# Patient Record
Sex: FEMALE | Race: WHITE | NOT HISPANIC OR LATINO | Employment: FULL TIME | URBAN - METROPOLITAN AREA
[De-identification: names, ages, dates, MRNs, and addresses within clinical notes are randomized per-mention and may not be internally consistent; named-entity substitution may affect disease eponyms.]

---

## 2023-08-30 ENCOUNTER — OFFICE VISIT (OUTPATIENT)
Dept: PRIMARY CARE | Facility: CLINIC | Age: 38
End: 2023-08-30
Payer: COMMERCIAL

## 2023-08-30 VITALS
TEMPERATURE: 97.1 F | OXYGEN SATURATION: 96 % | DIASTOLIC BLOOD PRESSURE: 78 MMHG | BODY MASS INDEX: 43.95 KG/M2 | WEIGHT: 280 LBS | SYSTOLIC BLOOD PRESSURE: 136 MMHG | HEART RATE: 97 BPM | HEIGHT: 67 IN

## 2023-08-30 DIAGNOSIS — H69.91 DYSFUNCTION OF RIGHT EUSTACHIAN TUBE: ICD-10-CM

## 2023-08-30 DIAGNOSIS — J32.9 RECURRENT RHINOSINUSITIS: Primary | ICD-10-CM

## 2023-08-30 DIAGNOSIS — I80.01 THROMBOPHLEBITIS OF SUPERFICIAL VEINS OF RIGHT LOWER EXTREMITY: ICD-10-CM

## 2023-08-30 PROCEDURE — 99213 OFFICE O/P EST LOW 20 MIN: CPT | Performed by: INTERNAL MEDICINE

## 2023-08-30 NOTE — PROGRESS NOTES
"Subjective   Janeen Santillan is a 38 y.o. female who presents for Nasal Congestion.  HPI  About 1 month ago had URI with sinus and ear congestion.  Sick for about 4-5 days, then went to  and prescribed bactrim for 10 days and Flonase.  Symptoms improved and developed a rash two days after completing therapy.  Went to  in [place] and told it was a delayed allergic reaction.  Cleared up on its own.    2 weeks ago, recurred, took Mucinex-D.  Feeling better but right ear is full.  Lingering congestion and cough.  Has not used Flonase in the last few days.    Also worried about \"blood clot\" in the back of her leg.  Told my mom to ask.  Has had chronic spot there was warm one day.    Objective   /78   Pulse 97   Temp 36.2 °C (97.1 °F)   Ht 1.689 m (5' 6.5\")   Wt 127 kg (280 lb)   SpO2 96%   BMI 44.52 kg/m²    Physical Exam  NAD  Right OME  Right nasal congestion and swelling  Clear OP  No lymphadenopathy  Lungs clear  Right calf VV with possible slight warm and nodule, non-tender; no edema  Assessment/Plan     Recurrent rhino-sinusitis:  residual right middle ear effusion after URI, persistent mild rhinosinusitis and eustachian tube dysfunction. Demonstrated Politzer maneuver, discussed causes of her symptoms and recommend course of Afrin/sinus wash/nasal ICS with instructions provided.     Superficial thrombophlebitis: ice and NSAIDs, reassured  Problem List Items Addressed This Visit    None           Kris Mckeon MD   "

## 2025-04-09 ENCOUNTER — APPOINTMENT (OUTPATIENT)
Dept: PRIMARY CARE | Facility: CLINIC | Age: 40
End: 2025-04-09
Payer: COMMERCIAL

## 2025-04-09 VITALS
BODY MASS INDEX: 39.37 KG/M2 | WEIGHT: 245 LBS | TEMPERATURE: 98 F | SYSTOLIC BLOOD PRESSURE: 129 MMHG | OXYGEN SATURATION: 98 % | HEIGHT: 66 IN | HEART RATE: 73 BPM | RESPIRATION RATE: 18 BRPM | DIASTOLIC BLOOD PRESSURE: 82 MMHG

## 2025-04-09 DIAGNOSIS — J32.9 CHRONIC RHINOSINUSITIS: Primary | ICD-10-CM

## 2025-04-09 DIAGNOSIS — R00.2 PALPITATIONS: ICD-10-CM

## 2025-04-09 PROBLEM — L70.0 ACNE VULGARIS: Status: ACTIVE | Noted: 2025-04-09

## 2025-04-09 PROBLEM — M25.361 INSTABILITY OF BOTH KNEE JOINTS: Status: ACTIVE | Noted: 2025-04-09

## 2025-04-09 PROBLEM — H69.91 DYSFUNCTION OF RIGHT EUSTACHIAN TUBE: Status: ACTIVE | Noted: 2025-04-09

## 2025-04-09 PROBLEM — L91.8 OTHER HYPERTROPHIC DISORDERS OF THE SKIN: Status: ACTIVE | Noted: 2022-08-25

## 2025-04-09 PROBLEM — D48.5 NEOPLASM OF UNCERTAIN BEHAVIOR OF SKIN: Status: ACTIVE | Noted: 2022-08-25

## 2025-04-09 PROBLEM — J01.90 ACUTE RHINOSINUSITIS: Status: ACTIVE | Noted: 2025-04-09

## 2025-04-09 PROBLEM — M67.431 GANGLION OF RIGHT WRIST: Status: ACTIVE | Noted: 2025-04-09

## 2025-04-09 PROBLEM — M17.0 OSTEOARTHRITIS OF KNEES, BILATERAL: Status: ACTIVE | Noted: 2025-04-09

## 2025-04-09 PROBLEM — M76.70 PERONEAL TENDINITIS: Status: ACTIVE | Noted: 2025-04-09

## 2025-04-09 PROBLEM — G89.29 CHRONIC PAIN OF LEFT KNEE: Status: ACTIVE | Noted: 2025-04-09

## 2025-04-09 PROBLEM — M25.673 ANKLE STIFFNESS: Status: ACTIVE | Noted: 2025-04-09

## 2025-04-09 PROBLEM — M25.362 INSTABILITY OF BOTH KNEE JOINTS: Status: ACTIVE | Noted: 2025-04-09

## 2025-04-09 PROBLEM — L81.4 OTHER MELANIN HYPERPIGMENTATION: Status: ACTIVE | Noted: 2022-08-25

## 2025-04-09 PROBLEM — D23.9 OTHER BENIGN NEOPLASM OF SKIN, UNSPECIFIED: Status: ACTIVE | Noted: 2022-08-25

## 2025-04-09 PROBLEM — M22.2X2 PATELLOFEMORAL PAIN SYNDROME OF LEFT KNEE: Status: ACTIVE | Noted: 2025-04-09

## 2025-04-09 PROBLEM — M25.572 CHRONIC PAIN OF LEFT ANKLE: Status: ACTIVE | Noted: 2025-04-09

## 2025-04-09 PROBLEM — G89.29 CHRONIC PAIN OF LEFT ANKLE: Status: ACTIVE | Noted: 2025-04-09

## 2025-04-09 PROBLEM — D22.5 MELANOCYTIC NEVI OF TRUNK: Status: ACTIVE | Noted: 2022-08-25

## 2025-04-09 PROBLEM — M25.562 CHRONIC PAIN OF LEFT KNEE: Status: ACTIVE | Noted: 2025-04-09

## 2025-04-09 PROBLEM — D18.01 HEMANGIOMA OF SKIN AND SUBCUTANEOUS TISSUE: Status: ACTIVE | Noted: 2022-08-25

## 2025-04-09 ASSESSMENT — ENCOUNTER SYMPTOMS
DEPRESSION: 0
LOSS OF SENSATION IN FEET: 0
OCCASIONAL FEELINGS OF UNSTEADINESS: 0

## 2025-04-09 ASSESSMENT — PATIENT HEALTH QUESTIONNAIRE - PHQ9
SUM OF ALL RESPONSES TO PHQ9 QUESTIONS 1 AND 2: 0
1. LITTLE INTEREST OR PLEASURE IN DOING THINGS: NOT AT ALL
2. FEELING DOWN, DEPRESSED OR HOPELESS: NOT AT ALL

## 2025-04-09 ASSESSMENT — COLUMBIA-SUICIDE SEVERITY RATING SCALE - C-SSRS
2. HAVE YOU ACTUALLY HAD ANY THOUGHTS OF KILLING YOURSELF?: NO
6. HAVE YOU EVER DONE ANYTHING, STARTED TO DO ANYTHING, OR PREPARED TO DO ANYTHING TO END YOUR LIFE?: NO
1. IN THE PAST MONTH, HAVE YOU WISHED YOU WERE DEAD OR WISHED YOU COULD GO TO SLEEP AND NOT WAKE UP?: NO

## 2025-04-09 NOTE — PATIENT INSTRUCTIONS
- take an antihistamine (Claritin, Allegra, Xyzal or similar generic) every night.  - Daily Neti-pot followed by nasal inhaled steroid (Flonase, Nasocort or generic equivalent)  -  let me know how you're doing in 2 weeks.  It may take 4-6 weeks to improve  - We may consider ENT or allergy referral  - try to avoid or limit Afrin and Mucinex-D

## 2025-04-09 NOTE — PROGRESS NOTES
"Subjective   Patient ID: Janeen Santillan is a 39 y.o. female who presents for Nasal Congestion.    Symptoms started with URI in late Nov/Dec.  Self treated with OTC medications, nasal sprays and [place] pot and eventually felt better and mostly cleared up but her nasal congestion never fully went away.  Feels congested daily and worse at night, often wakes up with headaches as well as cough and mucus.  Also has occasional chest tightness and heart fluttering, occurs randomly, brief, nonexertional.  She has been using Mucinex D which seems to be the only thing that works and may use Afrin once or twice a week.  Has not used any other treatments recently since around December  Objective   Physical Exam    /82   Pulse 73   Temp 36.7 °C (98 °F) (Oral)   Resp 18   Ht 1.689 m (5' 6.5\")   Wt 111 kg (245 lb)   SpO2 98%   BMI 38.96 kg/m²      NAD  Normal otic canals, trace right middle ear effusion, normal light reflex  Nasal turbinates are inflammed and hyperemic, voice congested  No conjunctivitis  No adenopathy  MMM  RRR  Lungs CTAB    Assessment/Plan     Chronic rhinosinusitis: likely allergic  - trial of daily H1B and sinus wash with nasal ICS  - limit Afrin and mucinex D    Chest pain/palps: non-cardiace, may be exacerbated by Mucinex-D, CTM      Kris Mckeon MD         "

## 2025-05-05 ENCOUNTER — OFFICE VISIT (OUTPATIENT)
Dept: PRIMARY CARE | Facility: CLINIC | Age: 40
End: 2025-05-05
Payer: COMMERCIAL

## 2025-05-05 VITALS
OXYGEN SATURATION: 97 % | DIASTOLIC BLOOD PRESSURE: 85 MMHG | SYSTOLIC BLOOD PRESSURE: 119 MMHG | WEIGHT: 243.3 LBS | BODY MASS INDEX: 39.1 KG/M2 | HEIGHT: 66 IN | HEART RATE: 93 BPM

## 2025-05-05 DIAGNOSIS — J32.9 CHRONIC RHINOSINUSITIS: Primary | ICD-10-CM

## 2025-05-05 PROCEDURE — 3008F BODY MASS INDEX DOCD: CPT | Performed by: INTERNAL MEDICINE

## 2025-05-05 PROCEDURE — 99213 OFFICE O/P EST LOW 20 MIN: CPT | Performed by: INTERNAL MEDICINE

## 2025-05-05 PROCEDURE — G8433 SCR FOR DEP NOT CPT DOC RSN: HCPCS | Performed by: INTERNAL MEDICINE

## 2025-05-05 RX ORDER — AZELASTINE 1 MG/ML
1 SPRAY, METERED NASAL DAILY
Qty: 30 ML | Refills: 1 | Status: SHIPPED | OUTPATIENT
Start: 2025-05-05 | End: 2026-05-05

## 2025-05-05 ASSESSMENT — PATIENT HEALTH QUESTIONNAIRE - PHQ9
2. FEELING DOWN, DEPRESSED OR HOPELESS: NOT AT ALL
SUM OF ALL RESPONSES TO PHQ9 QUESTIONS 1 AND 2: 0
1. LITTLE INTEREST OR PLEASURE IN DOING THINGS: NOT AT ALL

## 2025-05-05 ASSESSMENT — COLUMBIA-SUICIDE SEVERITY RATING SCALE - C-SSRS
1. IN THE PAST MONTH, HAVE YOU WISHED YOU WERE DEAD OR WISHED YOU COULD GO TO SLEEP AND NOT WAKE UP?: NO
2. HAVE YOU ACTUALLY HAD ANY THOUGHTS OF KILLING YOURSELF?: NO
6. HAVE YOU EVER DONE ANYTHING, STARTED TO DO ANYTHING, OR PREPARED TO DO ANYTHING TO END YOUR LIFE?: NO

## 2025-05-05 NOTE — PROGRESS NOTES
"Subjective   Patient ID: Janeen Santillan is a 39 y.o. female who presents for Follow-up.    See visit 4/9/2025 regarding nasal congestion which started with a URI in late 2024.  We asked her to limit Afrin and Mucinex D (also having chest tightness and palpitations) and start daily antihistamine, sinus wash and nasal ICS.    Not improving.  Using loratidine, NetiPot and Flonase regularly.    This past Friday had some sore throat and Saturday had increased congestion.  Took Mucinex D which helped.  Symptoms persist.  Not feeling sick.  After walking dog felt exhausted.    No change in the palpitations and chest tightness even without taking Mucinex D.  Occur randomly, often at rest, lasts 10 seconds and then disappears.  Occurs a few times per week.  Never for longer stretches.      Objective   Physical Exam    /85   Pulse 93   Ht 1.664 m (5' 5.5\")   Wt 110 kg (243 lb 4.8 oz)   SpO2 97%   BMI 39.87 kg/m²      NAD  Normal otic canals, trace right middle ear effusion, normal light reflex  Nasal turbinates are inflammed and hyperemic, voice congested  No conjunctivitis  No adenopathy  MMM  RRR  Lungs CTAB    Assessment/Plan     Chronic rhinosinusitis: likely allergic  - trial of one month of daily H1B and sinus wash with nasal ICS without benefit  - add azelastine  - referral to ENT (and consider allergy referral)  - limit Afrin and mucinex D  - no antibiotics for now but monitor for worsening     Chest pain/palps: likely mild ectopics, no red flags, may be exacerbated by Mucinex-D, CTM;       Kris Mckeon MD         "

## 2025-05-19 ENCOUNTER — HOSPITAL ENCOUNTER (OUTPATIENT)
Dept: RADIOLOGY | Facility: HOSPITAL | Age: 40
Discharge: HOME | End: 2025-05-19
Payer: COMMERCIAL

## 2025-05-19 ENCOUNTER — APPOINTMENT (OUTPATIENT)
Dept: ORTHOPEDIC SURGERY | Facility: HOSPITAL | Age: 40
End: 2025-05-19
Payer: COMMERCIAL

## 2025-05-19 DIAGNOSIS — M17.12 PATELLOFEMORAL ARTHRITIS OF LEFT KNEE: Primary | ICD-10-CM

## 2025-05-19 DIAGNOSIS — M25.562 LEFT KNEE PAIN, UNSPECIFIED CHRONICITY: ICD-10-CM

## 2025-05-19 PROCEDURE — 73564 X-RAY EXAM KNEE 4 OR MORE: CPT | Mod: LT

## 2025-05-19 PROCEDURE — 73564 X-RAY EXAM KNEE 4 OR MORE: CPT | Mod: LEFT SIDE | Performed by: RADIOLOGY

## 2025-05-19 PROCEDURE — 99214 OFFICE O/P EST MOD 30 MIN: CPT | Performed by: EMERGENCY MEDICINE

## 2025-05-19 RX ORDER — MELOXICAM 15 MG/1
15 TABLET ORAL DAILY
Qty: 30 TABLET | Refills: 1 | Status: SHIPPED | OUTPATIENT
Start: 2025-05-19 | End: 2025-07-18

## 2025-05-19 NOTE — PROGRESS NOTES
Subjective   Patient ID: Janeen Santillan is a 39 y.o. female who presents for Pain of the Left Knee.  History of Present Illness  The patient returns for reevaluation of left knee pain. She has bilateral knee DJD and received bilateral knee Durolane injections on 04/24/2023. In March 2025, she experienced a popping sensation in the posterior left knee while kneeling and leaning back, followed by immediate pain and soreness. She feels instability upon waking, especially during initial steps, but no severe pain. A similar popping sensation occurred a few weeks ago. She is concerned about underlying issues due to her active lifestyle, including running. Her right knee is generally stable but occasionally sore. Pain levels have slightly improved. She has abstained from leg press exercises and takes ibuprofen for pain, which is effective. No renal issues. Not pregnant; last menstrual period was 2 weeks ago.    She is considering an appointment for her ankle, previously evaluated during physical therapy around the COVID-19 pandemic, with potential extra bone development. She reports frequent swelling but no debilitating symptoms. She is concerned about worsening symptoms and believes a follow-up examination is warranted.    MEDICATIONS  Ibuprofen    All other systems have been reviewed and are negative for complaint.      Objective     There were no vitals taken for this visit.     Physical Exam  Left knee:  - No effusion in the left knee  - Crepitus with flexion and extension  - Full flexion and extension  - Negative valgus stress, varus stress, Lachman, and Rebeca tests  - Positive patellar grind and apprehension    Imaging:   I have personally reviewed and agree with Radiologist interpretation of previous imaging studies performed prior to this visit.  STUDY:  Left knee dated  5/19/2025.      INDICATION:  Signs/Symptoms:knee pain      COMPARISON:  Radiographs dated 12/12/2019.      ACCESSION  NUMBER(S):  [id number]      ORDERING CLINICIAN:  MILA RICHEY      TECHNIQUE:  Four views of the left knee.      FINDINGS:  No fracture or dislocation is evident.  No knee effusion is evident.  There is mild tricompartmental degenerative change of the knee.  No  soft tissue gas or radiopaque foreign body is evident.      IMPRESSION:  Degenerative changes without osseous injury evident.      MACRO:  None      Signed by: Nikolas Larkin 5/19/2025 9:40 AM  Dictation workstation:   [id number]      1. Patellofemoral arthritis of left knee  meloxicam (Mobic) 15 mg tablet      2. Left knee pain, unspecified chronicity  XR knee left 4+ views          Assessment & Plan  Left knee pain  - Popping sensation in the posterior left knee in March 2025 and a few weeks ago  - Soreness and instability, especially in the mornings  - Examination shows crepitus, positive patellar grind, and apprehension  - X-rays show arthritis and potential cartilage loss under the kneecap  - Advised to maintain a weight resistance angle of 90 degrees or less during exercises and perform backward lunges  - Prescribed meloxicam 15 mg once daily with food  - Consider repeat gel or steroid injection if symptoms worsen    Ankle pain  - Occasional swelling in the ankle, previously evaluated during physical therapy  - Suspected extra bone contributing to discomfort  - Follow-up appointment will be scheduled    PROCEDURE  Bilateral knee Durolane injections on 04/24/2023.    Mila Richey DO         This medical note was created with the assistance of artificial intelligence (AI) for documentation purposes. The content has been reviewed and confirmed by the healthcare provider for accuracy and completeness. Patient consented to the use of audio recording and use of AI during their visit.

## 2025-06-18 ENCOUNTER — OFFICE VISIT (OUTPATIENT)
Facility: CLINIC | Age: 40
End: 2025-06-18
Payer: COMMERCIAL

## 2025-06-18 VITALS — HEIGHT: 65 IN | BODY MASS INDEX: 40.48 KG/M2 | WEIGHT: 243 LBS

## 2025-06-18 DIAGNOSIS — J39.2 NASOPHARYNGEAL MASS: ICD-10-CM

## 2025-06-18 DIAGNOSIS — J32.9 CHRONIC RHINOSINUSITIS: Primary | ICD-10-CM

## 2025-06-18 DIAGNOSIS — R09.81 NASAL CONGESTION: ICD-10-CM

## 2025-06-18 DIAGNOSIS — R09.82 POST-NASAL DRAINAGE: ICD-10-CM

## 2025-06-18 DIAGNOSIS — J34.2 DEVIATED NASAL SEPTUM: ICD-10-CM

## 2025-06-18 PROCEDURE — 99204 OFFICE O/P NEW MOD 45 MIN: CPT | Performed by: OTOLARYNGOLOGY

## 2025-06-18 PROCEDURE — 31231 NASAL ENDOSCOPY DX: CPT | Performed by: OTOLARYNGOLOGY

## 2025-06-18 PROCEDURE — 3008F BODY MASS INDEX DOCD: CPT | Performed by: OTOLARYNGOLOGY

## 2025-06-18 ASSESSMENT — PATIENT HEALTH QUESTIONNAIRE - PHQ9: 1. LITTLE INTEREST OR PLEASURE IN DOING THINGS: NOT AT ALL

## 2025-06-18 NOTE — PROGRESS NOTES
Sinus & Skull Base Surgery    Chief Complaint:  Chronic sinusitis    History Of Present Illness:  Reason For Visit:   Janeen Santillan was referred to me by Dr. Kris Mckeon for evaluation of chronic sinusitis.    Long history of sinus infections    Over the last 8 months persistent congestion at night; can wake her up.  Nothing she has found fixes it.      She gets up coughing out some mucous.  During the day she can feel congestion intermittently.    Main Symptoms:  Patient does not have anterior nasal drainage.     Patient has posterior nasal drainage.  Happens in the AM.  Patient has nasal airway obstruction.  Bilaterally.  Right seems to be a bit better.    Patient has facial pain.  < 50% of the time.   Patient has facial pressure< 50% of the time.   Patient does not have decreased sense of smell.    Associated Symptoms:   Patient does not have headaches.    Patient has throat clearing.  In the AM.  Patient has coughing.  In the AM.  Patient does not have dysphonia.   Patient does not have nasal bleeding.    Medications currently on for sinonasal symptoms:  Azelastine 1 puff each side Qday  Mucinex D PRN  Vicks vapor inhaler PRN     Medications tried in the past for sinonasal symptoms:  Saline rinse  Flonase   Afrin some provided benefit     Other Pertinent Medical Conditions:   Patient does not have asthma.    Patient does not have aspirin sensitivity.    Patient does not have migraines.    Patient does not have history of allergy testing.   Patient does not have history of sinus surgery.    Patient does not have history of nasal fracture.    Patient has heartburn.  Once in a while.   The patient does not take medical therapy for heartburn.   The patient does not have imaging of sinuses.    Active Problems:  Problem List[1]    Past Medical History:  She has a past medical history of Chronic sinusitis, unspecified and Elevated blood-pressure reading, without diagnosis of hypertension  "(09/09/2019).    Surgical History:  She has a past surgical history that includes Other surgical history (09/09/2019).     Family History:  Family History[2]    Social History:  She reports that she has never smoked. She has never used smokeless tobacco. No history on file for alcohol use and drug use.     Allergies:  Amoxicillin and Sulfa (sulfonamide antibiotics)    Current Meds:  Current Medications[3]    Vitals:  Visit Vitals  Ht 1.651 m (5' 5\")   Wt 110 kg (243 lb)   BMI 40.44 kg/m²   Smoking Status Never   BSA 2.25 m²     Physical Exam:  CONSTITUTIONAL:  Vitals reviewed in nursing chart, well developed, well nourished.    RESPIRATION:  Breathing comfortably, no stridor.  CV:  No clubbing/cyanosis/edema in hands.  EYES:  EOM Intact, sclera normal.  NEURO:  Alert and oriented times 3, Cranial nerves 2-12 intact and symmetric bilaterally.  HEAD AND FACE:  Skin with no masses or lesions, sinuses nontender to palpation.  SALIVARY GLANDS:  Parotid and submandibular glands normal bilaterally.  EARS:  Normal external ears, external auditory canals, and TMs to otoscopy, normal hearing to whispered voice.  NOSE:  External nose midline, anterior rhinoscopy is normal with limited visualization to the anterior aspect of the inferior turbinates (see nasal endoscopy).  ORAL CAVITY/OROPHARYNX/LIPS:  Normal mucous membranes, normal floor of mouth/tongue/OP, no masses or lesions are noted.  NECK/LYMPH:  No LAD, no thyroid masses.    SINONASAL ENDOSCOPY (CPT 36248): To better evaluate the patient's symptoms, sinonasal endoscopy is indicated.  After discussion of risks and benefits, and topical decongestion and anesthesia, an endoscope was used to perform nasal endoscopy on each side.  A time out identifying the patient, the procedure, the location of the procedure and any concerns was performed prior to beginning the procedure.    Findings:  Examination of each nasal cavity revealed a normal middle meatus and sphenoethmoid " recess without pus or polyps. The septum was deviated to the right. Within each posterior choana, there is a lesion nearly blocking each nasal cavity. She had a very good decongestive response.    Results/Data:  I personally reviewed the note from Dr. Kris Mckeon dated 5/5/2025. ***    Provider Impressions:  1. Chronic sinusitis  2. Facial pain/pressure  3. Postnasal drainage  4. Nasal obstruction  5. Throat clearing, cough    Discussion: Janeen Manriquezcara     She could certainly get blood allergy testing.  CT sinus ordered  Have Gissel or Blanca call to schedule follow up.  Follow up virtually with me after completion of the CT sinus.    She was amenable to this, and all questions were answered.    Patient Discussion/Summary:  Welcome to Dr. Arsalan Brandt's clinic.  He is an ENT physician that specializes in nose, sinus, and skull base disorders.    Dr. Arsalan Brandt's office number is [phone].  Please call this number to contact his care team regardless of which office you use to access care.  This number is the most direct way to communicate with all the members of the care team.    His care team includes:    :  Nyla Leon  Available to receive calls Monday through Friday from 8:00 am until 4:25 pm  She can help you with scheduling of appointments and any general, non-medical questions about the practice    Primary nurse:  Gissel Del Valle, RN, BSN  Available to receive calls Tuesday through Friday from 8:00 am until 4:25 pm  Gissel is also Dr. Brandt's surgery scheduler and will assist you with planning and scheduling of your surgery during her office hours    Blanca Mayfield RN, BSN  Available to receive calls Monday through Thursday from 8:00 am until 4:25 pm    Rhinology Nurse Practitioner:  Blanca Crow CNP (sees patients in [place], [facility], and [facility])  She works collaboratively with Dr. Brandt.  If a more urgent appointment is needed she is a wonderful  resource.    Caleb Santiago MD (sees patients in [place] and [facility])  Christian Santiago MD (sees patients in [facility], [facility], and [place])  Dr. Brandt's partners in the division of Rhinology    For your convenience, Dr. Brandt sees patients at [facility] and [facility].  While we try to make your appointments as convenient as possible, occasionally a visit to another location may be necessary to provide the best care for you.    We look forward to working with you to meet your healthcare goals.    Scribe Attestation  By signing my name below, I, Kris Kaitlin, Mic[place]ashley, attest that this documentation has been prepared under the direction and in the presence of Arsalan Brandt MD.    Signature:  Arsalan Brandt MD         [1]   Patient Active Problem List  Diagnosis    Chronic pain of left knee    Acne vulgaris    Acute rhinosinusitis    Ankle stiffness    Chronic pain of left ankle    Dysfunction of right eustachian tube    Ganglion of right wrist    Hemangioma of skin and subcutaneous tissue    Neoplasm of uncertain behavior of skin    Other benign neoplasm of skin, unspecified    Instability of both knee joints    Melanocytic nevi of trunk    Osteoarthritis of knees, bilateral    Other hypertrophic disorders of the skin    Other melanin hyperpigmentation    Patellofemoral pain syndrome of left knee    Peroneal tendinitis   [2] No family history on file.  [3]   Current Outpatient Medications:     azelastine (Astelin) 137 mcg (0.1 %) nasal spray, Administer 1 spray into each nostril once daily. Use in each nostril as directed, Disp: 30 mL, Rfl: 1    meloxicam (Mobic) 15 mg tablet, Take 1 tablet (15 mg) by mouth once daily., Disp: 30 tablet, Rfl: 1     skin    Other melanin hyperpigmentation    Patellofemoral pain syndrome of left knee    Peroneal tendinitis   [2] No family history on file.  [3]   Current Outpatient Medications:     azelastine (Astelin) 137 mcg (0.1 %) nasal spray, Administer 1 spray into each nostril once daily. Use in each nostril as directed, Disp: 30 mL, Rfl: 1    meloxicam (Mobic) 15 mg tablet, Take 1 tablet (15 mg) by mouth once daily., Disp: 30 tablet, Rfl: 1

## 2025-06-18 NOTE — LETTER
June 30, 2025     Kris Mckeon MD  [address] [address]  [facility] [address]    Patient: Janeen Santillan   YOB: 1985   Date of Visit: 6/18/2025       Dear Dr. Kris Mckeon MD:    Thank you for referring Janeen Santillan to me for evaluation. Below are my notes for this consultation.  If you have questions, please do not hesitate to call me. I look forward to following your patient along with you.       Sincerely,     Arsalan Brandt MD      CC: No Recipients  ______________________________________________________________________________________              Sinus & Skull Base Surgery    Chief Complaint:  Chronic sinusitis    History Of Present Illness:  Reason For Visit:   Janeen Santillan was referred to me by Dr. Kris Mckeon for evaluation of chronic sinusitis.    She has a long history of recurrent sinus infections.  Over the last 8 months, she has had persistent nasal congestion at night and this can wake her up.  She has not found anything that resolves this issue despite multiple medical trials.  During the day she can get congested but it is more intermittent.      Main Symptoms:  Patient does not have anterior nasal drainage.     Patient has posterior nasal drainage.  Happens in the AM.  Patient has nasal airway obstruction.  Bilaterally.  Right seems to be a bit better.    Patient has facial pain.  < 50% of the time.   Patient has facial pressure< 50% of the time.   Patient does not have decreased sense of smell.    Associated Symptoms:   Patient does not have headaches.    Patient has throat clearing.  In the AM.  Patient has coughing.  In the AM.  Patient does not have dysphonia.   Patient does not have nasal bleeding.    Medications currently on for sinonasal symptoms:  Azelastine 1 puff each side Qday  Mucinex D PRN  Vicks vapor inhaler PRN     Medications tried in the past for sinonasal symptoms:  Saline rinse  Flonase   Afrin some provided benefit     Other  "Pertinent Medical Conditions:   Patient does not have asthma.    Patient does not have aspirin sensitivity.    Patient does not have migraines.    Patient does not have history of allergy testing.   Patient does not have history of sinus surgery.    Patient does not have history of nasal fracture.    Patient has heartburn.  Once in a while.   The patient does not take medical therapy for heartburn.   The patient does not have imaging of sinuses.    The remainder of a full 10 systems review of systems was pan negative outside of that stated in the history of present illness.    Active Problems:  Problem List[1]    Past Medical History:  She has a past medical history of Chronic sinusitis, unspecified and Elevated blood-pressure reading, without diagnosis of hypertension (09/09/2019).    Surgical History:  She has a past surgical history that includes Other surgical history (09/09/2019).     Family History:  Family History[2]    Social History:  She reports that she has never smoked. She has never used smokeless tobacco. No history on file for alcohol use and drug use.     Allergies:  Amoxicillin and Sulfa (sulfonamide antibiotics)    Current Meds:  Current Medications[3]    Vitals:  Visit Vitals  Ht 1.651 m (5' 5\")   Wt 110 kg (243 lb)   BMI 40.44 kg/m²   Smoking Status Never   BSA 2.25 m²     Physical Exam:  CONSTITUTIONAL:  Vitals reviewed in nursing chart, well developed, well nourished.    RESPIRATION:  Breathing comfortably, no stridor.  CV:  No clubbing/cyanosis/edema in hands.  EYES:  EOM Intact, sclera normal.  NEURO:  Alert and oriented times 3, Cranial nerves 2-12 intact and symmetric bilaterally.  HEAD AND FACE:  Skin with no masses or lesions, sinuses nontender to palpation.  SALIVARY GLANDS:  Parotid and submandibular glands normal bilaterally.  EARS:  Normal external ears, external auditory canals, and TMs to otoscopy, normal hearing to whispered voice.  NOSE:  External nose midline, anterior rhinoscopy " is normal with limited visualization to the anterior aspect of the interior turbinates (see nasal endoscopy).  ORAL CAVITY/OROPHARYNX/LIPS:  Normal mucous membranes, normal floor of mouth/tongue/OP, no masses or lesions are noted.  PHARYNGEAL WALLS AND NASOPHARYNX:  No masses noted.  NECK/LYMPH:  No LAD, no thyroid masses.  LARYNX:  Could not directly visualize transorally.    SINONASAL ENDOSCOPY (CPT 92212): To better evaluate the patient's symptoms, sinonasal endoscopy is indicated.  After discussion of risks and benefits, and topical decongestion and anesthesia, an endoscope was used to perform nasal endoscopy on each side.  A time out identifying the patient, the procedure, the location of the procedure and any concerns was performed prior to beginning the procedure.    Findings: Examination of each nasal cavity revealed a normal middle meatus and sphenoethmoid recess without pus or polyps.  She has a septal deviation to the right.  There was bilateral inferior turban hypertrophy.  Within the nasopharynx, there was a lesion nearly blocking each posterior choana likely the cause of her ongoing congestion.    Results/Data:  I personally reviewed the note from Dr. Kris Mckeon dated 5/5/2025.  During this evaluation she was referred to ENT but was asked to use a number of medical therapies for her ongoing symptoms.    Provider Impressions:  1.  Nasal congestion; deviated septum; inferior turbinate hypertrophy; nasopharyngeal lesion  2.  Postnasal drainage  3.  Facial pain and pressure  4.  Throat clearing, coughing    Discussion: Janeen Santillan and I discussed her exam and symptoms.  I believe that what was noted in her nasopharynx is driving her ongoing congestion.  I recommended a noncontrast CT sinus to further evaluate these findings.  I will ask my nurse to assist in scheduling her so that she can obtain the CT sinus and I can see her either face-to-face or virtually soon after to review those findings.   All questions were answered.    Patient Discussion/Summary:  Welcome to Dr. Arsalan Brandt's clinic.  He is an ENT physician that specializes in nose, sinus, and skull base disorders.    DrOsiel Brandt's office number is [phone].  Please call this number to contact his care team regardless of which office you use to access care.  This number is the most direct way to communicate with all the members of the care team.    His care team includes:    :  Nyla Leon  Available to receive calls Monday through Friday from 8:00 am until 4:25 pm  She can help you with scheduling of appointments and any general, non-medical questions about the practice    Primary nurse:  Gissel Del Valle, RN, BSN  Available to receive calls Tuesday through Friday from 8:00 am until 4:25 pm  Gissel is also Dr. Brandt's surgery scheduler and will assist you with planning and scheduling of your surgery during her office hours    Blanca Mayfield RN, BSN  Available to receive calls Monday through Thursday from 8:00 am until 4:25 pm    Rhinology Nurse Practitioner:  Blanca Crow CNP (sees patients in [place], [facility], and [facility])  She works collaboratively with Dr. Brandt.  If a more urgent appointment is needed she is a wonderful resource.    Caleb Santiago MD (sees patients in [place] and [facility])  Christian Santiago MD (sees patients in [facility], [facility], and [place])  Dr. Brandt's partners in the division of Rhinology    For your convenience, Dr. Brandt sees patients at [facility] and [facility].  While we try to make your appointments as convenient as possible, occasionally a visit to another location may be necessary to provide the best care for you.    We look forward to working with you to meet your healthcare goals.    Scribe Attestation  By signing my name below, SILAS, Shawn Duran, attest that this documentation has been prepared under the direction and in the  presence of Arsalan Barndt MD.    Signature:  Arsalan Brandt MD       [1]  Patient Active Problem List  Diagnosis   • Chronic pain of left knee   • Acne vulgaris   • Acute rhinosinusitis   • Ankle stiffness   • Chronic pain of left ankle   • Dysfunction of right eustachian tube   • Ganglion of right wrist   • Hemangioma of skin and subcutaneous tissue   • Neoplasm of uncertain behavior of skin   • Other benign neoplasm of skin, unspecified   • Instability of both knee joints   • Melanocytic nevi of trunk   • Osteoarthritis of knees, bilateral   • Other hypertrophic disorders of the skin   • Other melanin hyperpigmentation   • Patellofemoral pain syndrome of left knee   • Peroneal tendinitis   [2]  No family history on file.  [3]    Current Outpatient Medications:   •  azelastine (Astelin) 137 mcg (0.1 %) nasal spray, Administer 1 spray into each nostril once daily. Use in each nostril as directed, Disp: 30 mL, Rfl: 1  •  meloxicam (Mobic) 15 mg tablet, Take 1 tablet (15 mg) by mouth once daily., Disp: 30 tablet, Rfl: 1       [1]  Patient Active Problem List  Diagnosis   • Chronic pain of left knee   • Acne vulgaris   • Acute rhinosinusitis   • Ankle stiffness   • Chronic pain of left ankle   • Dysfunction of right eustachian tube   • Ganglion of right wrist   • Hemangioma of skin and subcutaneous tissue   • Neoplasm of uncertain behavior of skin   • Other benign neoplasm of skin, unspecified   • Instability of both knee joints   • Melanocytic nevi of trunk   • Osteoarthritis of knees, bilateral   • Other hypertrophic disorders of the skin   • Other melanin hyperpigmentation   • Patellofemoral pain syndrome of left knee   • Peroneal tendinitis   [2]  No family history on file.  [3]    Current Outpatient Medications:   •  azelastine (Astelin) 137 mcg (0.1 %) nasal spray, Administer 1 spray into each nostril once daily. Use in each nostril as directed, Disp: 30 mL, Rfl: 1  •  meloxicam (Mobic) 15 mg tablet,  Take 1 tablet (15 mg) by mouth once daily., Disp: 30 tablet, Rfl: 1

## 2025-07-02 ENCOUNTER — HOSPITAL ENCOUNTER (OUTPATIENT)
Dept: RADIOLOGY | Facility: CLINIC | Age: 40
Discharge: HOME | End: 2025-07-02
Payer: COMMERCIAL

## 2025-07-02 DIAGNOSIS — J39.2 NASOPHARYNGEAL MASS: ICD-10-CM

## 2025-07-02 PROCEDURE — 70486 CT MAXILLOFACIAL W/O DYE: CPT

## 2025-07-06 NOTE — PROGRESS NOTES
Sinus & Skull Base Surgery    Virtual or Telephone Consent  An interactive audio and video telecommunication system which permits real time communications between the patient (at the originating site) and provider (at the distant site) was utilized to provide this telehealth service.   Verbal consent was requested and obtained from Janeen Santillan on this date, 07/09/25 for a telehealth visit and the patient's location was confirmed at the time of the visit.    Chief Complaint:  1.  Nasal congestion; deviated septum; inferior turbinate hypertrophy; nasopharyngeal lesion  2.  Postnasal drainage  3.  Facial pain and pressure  4.  Throat clearing, coughing    History Of Present Illness:  Janeen Santillan presents since last being seen 6/18/2025. Following that evaluation, I recommended a CT sinus that was obtained. She presents today to review this.    Active Problems:  Problem List[1]    Past Medical History:  She has a past medical history of Chronic sinusitis, unspecified and Elevated blood-pressure reading, without diagnosis of hypertension (09/09/2019).    Surgical History:  She has a past surgical history that includes Other surgical history (09/09/2019).    Family History:  Family History[2]    Social History:  She reports that she has never smoked. She has never used smokeless tobacco. No history on file for alcohol use and drug use.    Allergies:  Amoxicillin and Sulfa (sulfonamide antibiotics)    Current Meds:  Current Medications[3]    Vitals:  Visit Vitals  Smoking Status Never     Physical Exam:  Virtual visit.    Results/Data:  I personally reviewed the CT sinus dated 7/2/2025 with the patient today in clinic.  Her sinuses were clear on the study.  Again demonstrated was what appears to be adenoid hypertrophy.    The impression from the official report is listed below:  CT of the paranasal sinuses, mastoid sinuses and orbits is within normal limits.    Provider Impressions:  1.  Nasal  congestion; deviated septum; inferior turbinate hypertrophy; nasopharyngeal lesion  2.  Postnasal drainage  3.  Facial pain and pressure  4.  Throat clearing, coughing    Discussion:  Janeen Santillan and I discussed her exam and symptoms.  I recommended surgical intervention both to improve her breathing as well as to obtain biopsies of what I am seeing in the nasopharynx.    The surgery itself was discussed at length.  The risks, benefits, and alternatives were explained in detail which include but is not limited to: persistence of symptoms, pain, bleeding, smell loss or alteration, infection, or need for nasal packing.  It would be extremely unlikely with this type of procedure to have any kind of orbital or skull base injury.  Facial numbness would also be unlikely.  We discussed proximity to the soft palate and eustachian tube orifice and risk to those structures with this type of procedure.    She was comfortable moving forward with endonasal surgery and I will schedule her at her convenience moving forward.  All questions were answered.    Scribe Attestation  By signing my name below, I, Kris Madrigal, [place], attest that this documentation has been prepared under the direction and in the presence of Arsalan Brandt MD.    Signature:  Arsalan Brandt MD       [1]   Patient Active Problem List  Diagnosis    Chronic pain of left knee    Acne vulgaris    Acute rhinosinusitis    Ankle stiffness    Chronic pain of left ankle    Dysfunction of right eustachian tube    Ganglion of right wrist    Hemangioma of skin and subcutaneous tissue    Neoplasm of uncertain behavior of skin    Other benign neoplasm of skin, unspecified    Instability of both knee joints    Melanocytic nevi of trunk    Osteoarthritis of knees, bilateral    Other hypertrophic disorders of the skin    Other melanin hyperpigmentation    Patellofemoral pain syndrome of left knee    Peroneal tendinitis   [2] No family history on file.  [3]    Current Outpatient Medications:     azelastine (Astelin) 137 mcg (0.1 %) nasal spray, Administer 1 spray into each nostril once daily. Use in each nostril as directed, Disp: 30 mL, Rfl: 1    meloxicam (Mobic) 15 mg tablet, Take 1 tablet (15 mg) by mouth once daily., Disp: 30 tablet, Rfl: 1

## 2025-07-09 ENCOUNTER — TELEMEDICINE (OUTPATIENT)
Facility: CLINIC | Age: 40
End: 2025-07-09
Payer: COMMERCIAL

## 2025-07-09 ENCOUNTER — APPOINTMENT (OUTPATIENT)
Facility: CLINIC | Age: 40
End: 2025-07-09
Payer: COMMERCIAL

## 2025-07-09 DIAGNOSIS — J39.2 NASOPHARYNGEAL MASS: Primary | ICD-10-CM

## 2025-07-09 DIAGNOSIS — R09.81 NASAL CONGESTION: ICD-10-CM

## 2025-07-09 DIAGNOSIS — J34.3 HYPERTROPHY OF BOTH INFERIOR NASAL TURBINATES: ICD-10-CM

## 2025-07-09 PROCEDURE — 99214 OFFICE O/P EST MOD 30 MIN: CPT | Performed by: OTOLARYNGOLOGY

## 2025-07-11 DIAGNOSIS — J35.2 HYPERTROPHY OF ADENOIDS ALONE: Primary | ICD-10-CM

## 2025-07-11 DIAGNOSIS — J34.3 HYPERTROPHY OF INFERIOR NASAL TURBINATE: ICD-10-CM

## 2025-07-30 DIAGNOSIS — Z41.9 ELECTIVE SURGERY: ICD-10-CM

## 2025-07-30 DIAGNOSIS — J34.3 HYPERTROPHY OF INFERIOR NASAL TURBINATE: ICD-10-CM

## 2025-07-30 DIAGNOSIS — J35.2 HYPERTROPHY OF ADENOIDS ALONE: ICD-10-CM

## 2025-07-31 ENCOUNTER — LAB (OUTPATIENT)
Dept: LAB | Facility: HOSPITAL | Age: 40
End: 2025-07-31
Payer: COMMERCIAL

## 2025-07-31 DIAGNOSIS — J34.3 HYPERTROPHY OF NASAL TURBINATES: Primary | ICD-10-CM

## 2025-07-31 PROCEDURE — 36415 COLL VENOUS BLD VENIPUNCTURE: CPT

## 2025-07-31 PROCEDURE — 85027 COMPLETE CBC AUTOMATED: CPT

## 2025-07-31 PROCEDURE — 80053 COMPREHEN METABOLIC PANEL: CPT

## 2025-08-01 LAB
ALBUMIN SERPL BCP-MCNC: 3.9 G/DL (ref 3.4–5)
ALP SERPL-CCNC: 55 U/L (ref 33–110)
ALT SERPL W P-5'-P-CCNC: 16 U/L (ref 7–45)
ANION GAP SERPL CALC-SCNC: 10 MMOL/L (ref 10–20)
AST SERPL W P-5'-P-CCNC: 15 U/L (ref 9–39)
BILIRUB SERPL-MCNC: 0.4 MG/DL (ref 0–1.2)
BUN SERPL-MCNC: 13 MG/DL (ref 6–23)
CALCIUM SERPL-MCNC: 9.3 MG/DL (ref 8.6–10.6)
CHLORIDE SERPL-SCNC: 104 MMOL/L (ref 98–107)
CO2 SERPL-SCNC: 31 MMOL/L (ref 21–32)
CREAT SERPL-MCNC: 0.82 MG/DL (ref 0.5–1.05)
EGFRCR SERPLBLD CKD-EPI 2021: >90 ML/MIN/1.73M*2
ERYTHROCYTE [DISTWIDTH] IN BLOOD BY AUTOMATED COUNT: 14.1 % (ref 11.5–14.5)
GLUCOSE SERPL-MCNC: 93 MG/DL (ref 74–99)
HCT VFR BLD AUTO: 45.6 % (ref 36–46)
HGB BLD-MCNC: 13.7 G/DL (ref 12–16)
MCH RBC QN AUTO: 26.9 PG (ref 26–34)
MCHC RBC AUTO-ENTMCNC: 30 G/DL (ref 32–36)
MCV RBC AUTO: 89 FL (ref 80–100)
NRBC BLD-RTO: 0 /100 WBCS (ref 0–0)
PLATELET # BLD AUTO: 336 X10*3/UL (ref 150–450)
POTASSIUM SERPL-SCNC: 4.4 MMOL/L (ref 3.5–5.3)
PROT SERPL-MCNC: 7.1 G/DL (ref 6.4–8.2)
RBC # BLD AUTO: 5.1 X10*6/UL (ref 4–5.2)
SODIUM SERPL-SCNC: 141 MMOL/L (ref 136–145)
WBC # BLD AUTO: 8.4 X10*3/UL (ref 4.4–11.3)

## 2025-08-11 ENCOUNTER — HOSPITAL ENCOUNTER (OUTPATIENT)
Dept: RADIOLOGY | Facility: HOSPITAL | Age: 40
Discharge: HOME | End: 2025-08-11
Payer: COMMERCIAL

## 2025-08-11 ENCOUNTER — OFFICE VISIT (OUTPATIENT)
Dept: ORTHOPEDIC SURGERY | Facility: HOSPITAL | Age: 40
End: 2025-08-11
Payer: COMMERCIAL

## 2025-08-11 DIAGNOSIS — M25.572 LEFT ANKLE PAIN, UNSPECIFIED CHRONICITY: ICD-10-CM

## 2025-08-11 DIAGNOSIS — M25.872 IMPINGEMENT OF LEFT ANKLE JOINT: Primary | ICD-10-CM

## 2025-08-11 PROCEDURE — 99213 OFFICE O/P EST LOW 20 MIN: CPT

## 2025-08-11 PROCEDURE — 99214 OFFICE O/P EST MOD 30 MIN: CPT | Performed by: EMERGENCY MEDICINE

## 2025-08-11 PROCEDURE — 1036F TOBACCO NON-USER: CPT | Performed by: EMERGENCY MEDICINE

## 2025-08-11 PROCEDURE — 73610 X-RAY EXAM OF ANKLE: CPT | Mod: LT

## 2025-08-11 PROCEDURE — 73610 X-RAY EXAM OF ANKLE: CPT | Mod: LEFT SIDE | Performed by: RADIOLOGY

## 2025-08-13 ENCOUNTER — ANESTHESIA EVENT (OUTPATIENT)
Dept: OPERATING ROOM | Facility: CLINIC | Age: 40
End: 2025-08-13
Payer: COMMERCIAL

## 2025-08-14 ENCOUNTER — ANESTHESIA (OUTPATIENT)
Dept: OPERATING ROOM | Facility: CLINIC | Age: 40
End: 2025-08-14
Payer: COMMERCIAL

## 2025-08-14 ENCOUNTER — HOSPITAL ENCOUNTER (OUTPATIENT)
Facility: CLINIC | Age: 40
Setting detail: OUTPATIENT SURGERY
Discharge: HOME | End: 2025-08-14
Attending: OTOLARYNGOLOGY | Admitting: OTOLARYNGOLOGY
Payer: COMMERCIAL

## 2025-08-14 VITALS
BODY MASS INDEX: 42.61 KG/M2 | WEIGHT: 255.73 LBS | DIASTOLIC BLOOD PRESSURE: 85 MMHG | RESPIRATION RATE: 16 BRPM | SYSTOLIC BLOOD PRESSURE: 130 MMHG | OXYGEN SATURATION: 99 % | TEMPERATURE: 97.5 F | HEIGHT: 65 IN | HEART RATE: 76 BPM

## 2025-08-14 DIAGNOSIS — Z41.9 ELECTIVE SURGERY: ICD-10-CM

## 2025-08-14 DIAGNOSIS — J34.3 HYPERTROPHY OF INFERIOR NASAL TURBINATE: ICD-10-CM

## 2025-08-14 DIAGNOSIS — G89.18 POST-OPERATIVE PAIN: ICD-10-CM

## 2025-08-14 DIAGNOSIS — J35.2 HYPERTROPHY OF ADENOIDS ALONE: Primary | ICD-10-CM

## 2025-08-14 LAB — PREGNANCY TEST URINE, POC: NEGATIVE

## 2025-08-14 PROCEDURE — 88185 FLOWCYTOMETRY/TC ADD-ON: CPT | Mod: TC | Performed by: OTOLARYNGOLOGY

## 2025-08-14 PROCEDURE — 30930 THER FX NASAL INF TURBINATE: CPT | Performed by: OTOLARYNGOLOGY

## 2025-08-14 PROCEDURE — 3600000009 HC OR TIME - EACH INCREMENTAL 1 MINUTE - PROCEDURE LEVEL FOUR: Performed by: OTOLARYNGOLOGY

## 2025-08-14 PROCEDURE — 7100000001 HC RECOVERY ROOM TIME - INITIAL BASE CHARGE: Performed by: OTOLARYNGOLOGY

## 2025-08-14 PROCEDURE — 7100000009 HC PHASE TWO TIME - INITIAL BASE CHARGE: Performed by: OTOLARYNGOLOGY

## 2025-08-14 PROCEDURE — 2720000007 HC OR 272 NO HCPCS: Performed by: OTOLARYNGOLOGY

## 2025-08-14 PROCEDURE — 3700000002 HC GENERAL ANESTHESIA TIME - EACH INCREMENTAL 1 MINUTE: Performed by: OTOLARYNGOLOGY

## 2025-08-14 PROCEDURE — 88341 IMHCHEM/IMCYTCHM EA ADD ANTB: CPT | Mod: TC,SUR | Performed by: OTOLARYNGOLOGY

## 2025-08-14 PROCEDURE — 3600000004 HC OR TIME - INITIAL BASE CHARGE - PROCEDURE LEVEL FOUR: Performed by: OTOLARYNGOLOGY

## 2025-08-14 PROCEDURE — 7100000010 HC PHASE TWO TIME - EACH INCREMENTAL 1 MINUTE: Performed by: OTOLARYNGOLOGY

## 2025-08-14 PROCEDURE — 3700000001 HC GENERAL ANESTHESIA TIME - INITIAL BASE CHARGE: Performed by: OTOLARYNGOLOGY

## 2025-08-14 PROCEDURE — 42831 REMOVAL OF ADENOIDS: CPT | Performed by: OTOLARYNGOLOGY

## 2025-08-14 PROCEDURE — 81025 URINE PREGNANCY TEST: CPT | Performed by: OTOLARYNGOLOGY

## 2025-08-14 PROCEDURE — 2500000004 HC RX 250 GENERAL PHARMACY W/ HCPCS (ALT 636 FOR OP/ED): Performed by: ANESTHESIOLOGIST ASSISTANT

## 2025-08-14 PROCEDURE — 7100000002 HC RECOVERY ROOM TIME - EACH INCREMENTAL 1 MINUTE: Performed by: OTOLARYNGOLOGY

## 2025-08-14 PROCEDURE — 31231 NASAL ENDOSCOPY DX: CPT | Performed by: OTOLARYNGOLOGY

## 2025-08-14 RX ORDER — ONDANSETRON HYDROCHLORIDE 2 MG/ML
INJECTION, SOLUTION INTRAVENOUS AS NEEDED
Status: DISCONTINUED | OUTPATIENT
Start: 2025-08-14 | End: 2025-08-14

## 2025-08-14 RX ORDER — TRAMADOL HYDROCHLORIDE 50 MG/1
50 TABLET, FILM COATED ORAL EVERY 6 HOURS PRN
Qty: 15 TABLET | Refills: 0 | Status: SHIPPED | OUTPATIENT
Start: 2025-08-14

## 2025-08-14 RX ORDER — FENTANYL CITRATE 50 UG/ML
25 INJECTION, SOLUTION INTRAMUSCULAR; INTRAVENOUS EVERY 5 MIN PRN
Status: DISCONTINUED | OUTPATIENT
Start: 2025-08-14 | End: 2025-08-14 | Stop reason: HOSPADM

## 2025-08-14 RX ORDER — FENTANYL CITRATE 50 UG/ML
INJECTION, SOLUTION INTRAMUSCULAR; INTRAVENOUS AS NEEDED
Status: DISCONTINUED | OUTPATIENT
Start: 2025-08-14 | End: 2025-08-14

## 2025-08-14 RX ORDER — LIDOCAINE HYDROCHLORIDE 20 MG/ML
INJECTION, SOLUTION EPIDURAL; INFILTRATION; INTRACAUDAL; PERINEURAL AS NEEDED
Status: DISCONTINUED | OUTPATIENT
Start: 2025-08-14 | End: 2025-08-14

## 2025-08-14 RX ORDER — OXYCODONE HYDROCHLORIDE 5 MG/1
5 TABLET ORAL
Status: DISCONTINUED | OUTPATIENT
Start: 2025-08-14 | End: 2025-08-14 | Stop reason: HOSPADM

## 2025-08-14 RX ORDER — METOCLOPRAMIDE HYDROCHLORIDE 5 MG/ML
10 INJECTION INTRAMUSCULAR; INTRAVENOUS ONCE AS NEEDED
Status: DISCONTINUED | OUTPATIENT
Start: 2025-08-14 | End: 2025-08-14 | Stop reason: HOSPADM

## 2025-08-14 RX ORDER — ONDANSETRON HYDROCHLORIDE 2 MG/ML
4 INJECTION, SOLUTION INTRAVENOUS ONCE AS NEEDED
Status: DISCONTINUED | OUTPATIENT
Start: 2025-08-14 | End: 2025-08-14 | Stop reason: HOSPADM

## 2025-08-14 RX ORDER — SODIUM CHLORIDE, SODIUM LACTATE, POTASSIUM CHLORIDE, CALCIUM CHLORIDE 600; 310; 30; 20 MG/100ML; MG/100ML; MG/100ML; MG/100ML
INJECTION, SOLUTION INTRAVENOUS CONTINUOUS PRN
Status: DISCONTINUED | OUTPATIENT
Start: 2025-08-14 | End: 2025-08-14

## 2025-08-14 RX ORDER — SODIUM CHLORIDE, SODIUM LACTATE, POTASSIUM CHLORIDE, CALCIUM CHLORIDE 600; 310; 30; 20 MG/100ML; MG/100ML; MG/100ML; MG/100ML
100 INJECTION, SOLUTION INTRAVENOUS CONTINUOUS
Status: DISCONTINUED | OUTPATIENT
Start: 2025-08-14 | End: 2025-08-14 | Stop reason: HOSPADM

## 2025-08-14 RX ORDER — MIDAZOLAM HYDROCHLORIDE 1 MG/ML
INJECTION, SOLUTION INTRAMUSCULAR; INTRAVENOUS AS NEEDED
Status: DISCONTINUED | OUTPATIENT
Start: 2025-08-14 | End: 2025-08-14

## 2025-08-14 RX ORDER — DIPHENHYDRAMINE HYDROCHLORIDE 50 MG/ML
12.5 INJECTION, SOLUTION INTRAMUSCULAR; INTRAVENOUS
Status: DISCONTINUED | OUTPATIENT
Start: 2025-08-14 | End: 2025-08-14 | Stop reason: HOSPADM

## 2025-08-14 RX ORDER — PROPOFOL 10 MG/ML
INJECTION, EMULSION INTRAVENOUS AS NEEDED
Status: DISCONTINUED | OUTPATIENT
Start: 2025-08-14 | End: 2025-08-14

## 2025-08-14 RX ORDER — DEXAMETHASONE SODIUM PHOSPHATE 10 MG/ML
INJECTION INTRAMUSCULAR; INTRAVENOUS AS NEEDED
Status: DISCONTINUED | OUTPATIENT
Start: 2025-08-14 | End: 2025-08-14

## 2025-08-14 RX ORDER — ROCURONIUM BROMIDE 10 MG/ML
INJECTION, SOLUTION INTRAVENOUS AS NEEDED
Status: DISCONTINUED | OUTPATIENT
Start: 2025-08-14 | End: 2025-08-14

## 2025-08-14 RX ORDER — PHENYLEPHRINE HCL IN 0.9% NACL 0.4MG/10ML
SYRINGE (ML) INTRAVENOUS AS NEEDED
Status: DISCONTINUED | OUTPATIENT
Start: 2025-08-14 | End: 2025-08-14

## 2025-08-14 RX ORDER — MEPERIDINE HYDROCHLORIDE 50 MG/ML
12.5 INJECTION INTRAMUSCULAR; INTRAVENOUS; SUBCUTANEOUS EVERY 10 MIN PRN
Status: DISCONTINUED | OUTPATIENT
Start: 2025-08-14 | End: 2025-08-14 | Stop reason: HOSPADM

## 2025-08-14 RX ORDER — CEFAZOLIN 1 G/1
INJECTION, POWDER, FOR SOLUTION INTRAVENOUS AS NEEDED
Status: DISCONTINUED | OUTPATIENT
Start: 2025-08-14 | End: 2025-08-14

## 2025-08-14 RX ORDER — HYDROMORPHONE HYDROCHLORIDE 1 MG/ML
0.5 INJECTION, SOLUTION INTRAMUSCULAR; INTRAVENOUS; SUBCUTANEOUS EVERY 5 MIN PRN
Status: DISCONTINUED | OUTPATIENT
Start: 2025-08-14 | End: 2025-08-14 | Stop reason: HOSPADM

## 2025-08-14 RX ADMIN — LIDOCAINE HYDROCHLORIDE 100 MG: 20 INJECTION, SOLUTION EPIDURAL; INFILTRATION; INTRACAUDAL; PERINEURAL at 07:40

## 2025-08-14 RX ADMIN — FENTANYL CITRATE 50 MCG: 50 INJECTION, SOLUTION INTRAMUSCULAR; INTRAVENOUS at 07:40

## 2025-08-14 RX ADMIN — PROPOFOL 25 MCG/KG/MIN: 10 INJECTION, EMULSION INTRAVENOUS at 07:45

## 2025-08-14 RX ADMIN — ONDANSETRON 4 MG: 2 INJECTION INTRAMUSCULAR; INTRAVENOUS at 08:16

## 2025-08-14 RX ADMIN — FENTANYL CITRATE 50 MCG: 50 INJECTION, SOLUTION INTRAMUSCULAR; INTRAVENOUS at 07:44

## 2025-08-14 RX ADMIN — CEFAZOLIN 2 G: 330 INJECTION, POWDER, FOR SOLUTION INTRAMUSCULAR; INTRAVENOUS at 07:43

## 2025-08-14 RX ADMIN — SUGAMMADEX 400 MG: 100 INJECTION, SOLUTION INTRAVENOUS at 08:24

## 2025-08-14 RX ADMIN — SODIUM CHLORIDE, POTASSIUM CHLORIDE, SODIUM LACTATE AND CALCIUM CHLORIDE: 600; 310; 30; 20 INJECTION, SOLUTION INTRAVENOUS at 07:38

## 2025-08-14 RX ADMIN — Medication 100 MCG: at 07:53

## 2025-08-14 RX ADMIN — DEXAMETHASONE SODIUM PHOSPHATE 10 MG: 10 INJECTION INTRAMUSCULAR; INTRAVENOUS at 07:48

## 2025-08-14 RX ADMIN — ROCURONIUM 50 MG: 50 INJECTION, SOLUTION INTRAVENOUS at 07:41

## 2025-08-14 RX ADMIN — ROCURONIUM 10 MG: 50 INJECTION, SOLUTION INTRAVENOUS at 08:02

## 2025-08-14 RX ADMIN — PROPOFOL 200 MG: 10 INJECTION, EMULSION INTRAVENOUS at 07:40

## 2025-08-14 RX ADMIN — MIDAZOLAM 2 MG: 1 INJECTION INTRAMUSCULAR; INTRAVENOUS at 07:39

## 2025-08-14 ASSESSMENT — PAIN SCALES - GENERAL
PAINLEVEL_OUTOF10: 0 - NO PAIN
PAINLEVEL_OUTOF10: 0 - NO PAIN

## 2025-08-14 ASSESSMENT — PAIN - FUNCTIONAL ASSESSMENT
PAIN_FUNCTIONAL_ASSESSMENT: UNABLE TO SELF-REPORT
PAIN_FUNCTIONAL_ASSESSMENT: 0-10
PAIN_FUNCTIONAL_ASSESSMENT: 0-10

## 2025-08-15 ASSESSMENT — PAIN SCALES - GENERAL: PAINLEVEL_OUTOF10: 0 - NO PAIN

## 2025-08-19 ENCOUNTER — APPOINTMENT (OUTPATIENT)
Dept: OTOLARYNGOLOGY | Facility: CLINIC | Age: 40
End: 2025-08-19
Payer: COMMERCIAL

## 2025-08-20 LAB
CELL COUNT (BLOOD): 5.45 X10*3/UL
CELL POPULATIONS: NORMAL
DIAGNOSIS: NORMAL
FLOW DIFFERENTIAL: NORMAL
FLOW TEST ORDERED: NORMAL
LAB TEST METHOD: NORMAL
NUMBER OF CELLS COLLECTED: NORMAL
PATH REPORT.TOTAL CANCER: NORMAL
SIGNATURE COMMENT: NORMAL
SPECIMEN VIABILITY: NORMAL

## 2025-08-21 LAB
LAB AP ASR DISCLAIMER: NORMAL
LABORATORY COMMENT REPORT: NORMAL
PATH REPORT.FINAL DX SPEC: NORMAL
PATH REPORT.GROSS SPEC: NORMAL
PATH REPORT.RELEVANT HX SPEC: NORMAL
PATH REPORT.TOTAL CANCER: NORMAL
RESIDENT REVIEW: NORMAL

## 2025-08-26 ENCOUNTER — APPOINTMENT (OUTPATIENT)
Dept: OTOLARYNGOLOGY | Facility: CLINIC | Age: 40
End: 2025-08-26
Payer: COMMERCIAL

## 2025-08-26 ASSESSMENT — PATIENT HEALTH QUESTIONNAIRE - PHQ9
2. FEELING DOWN, DEPRESSED OR HOPELESS: NOT AT ALL
1. LITTLE INTEREST OR PLEASURE IN DOING THINGS: NOT AT ALL
SUM OF ALL RESPONSES TO PHQ9 QUESTIONS 1 AND 2: 0

## 2025-09-10 ENCOUNTER — APPOINTMENT (OUTPATIENT)
Dept: OBSTETRICS AND GYNECOLOGY | Facility: CLINIC | Age: 40
End: 2025-09-10
Payer: COMMERCIAL

## (undated) DEVICE — TOWEL, SURGICAL, NEURO, O/R, 16 X 26, BLUE, STERILE

## (undated) DEVICE — DRESSING, NON-ADHERENT, TELFA, OUCHLESS, 3 X 8 IN, STERILE

## (undated) DEVICE — COVER HANDLE LIGHT, STERIS, BLUE, STERILE

## (undated) DEVICE — ELECTRODE, ELECTROSURGICAL, COAGULATOR, W/SUCTION, HANDSWITCHING, 8 FR, 6 IN

## (undated) DEVICE — DRAPE, FLUID WARMER

## (undated) DEVICE — HOLSTER, JET SAFETY

## (undated) DEVICE — GLOVE, SURGICAL, PROTEXIS PI ORTHO, 7.5, PF, LF

## (undated) DEVICE — CONTAINER, SPECIMEN, 4 OZ, OR PEEL PACK, STERILE

## (undated) DEVICE — DRAPE, INSTRUMENT, W/POUCH, STERI DRAPE, 9 5/8 X 18 LONG

## (undated) DEVICE — TUBING, SUCTION, NON-CONDUCTIVE,W/MALE CONNECT, 6MM X 12 FT, STERILE

## (undated) DEVICE — TRAP, SPECIMEN, NEPTUNE QUICK COLLECTOR

## (undated) DEVICE — Device

## (undated) DEVICE — BLADE, SHAVER, TRICUT, STRAIGHT SHOT MAGNUM, 4 MM, STERILE